# Patient Record
Sex: FEMALE | Race: WHITE | Employment: FULL TIME | URBAN - METROPOLITAN AREA
[De-identification: names, ages, dates, MRNs, and addresses within clinical notes are randomized per-mention and may not be internally consistent; named-entity substitution may affect disease eponyms.]

---

## 2018-05-03 ENCOUNTER — TELEPHONE (OUTPATIENT)
Dept: BREAST CENTER | Age: 48
End: 2018-05-03

## 2018-05-17 ENCOUNTER — OFFICE VISIT (OUTPATIENT)
Dept: BREAST CENTER | Age: 48
End: 2018-05-17
Payer: COMMERCIAL

## 2018-05-17 VITALS
RESPIRATION RATE: 16 BRPM | HEART RATE: 77 BPM | SYSTOLIC BLOOD PRESSURE: 122 MMHG | TEMPERATURE: 98.2 F | HEIGHT: 65 IN | WEIGHT: 140 LBS | OXYGEN SATURATION: 99 % | BODY MASS INDEX: 23.32 KG/M2 | DIASTOLIC BLOOD PRESSURE: 66 MMHG

## 2018-05-17 DIAGNOSIS — N60.99 ATYPICAL LOBULAR HYPERPLASIA (ALH) OF BREAST: ICD-10-CM

## 2018-05-17 PROCEDURE — 99203 OFFICE O/P NEW LOW 30 MIN: CPT

## 2018-05-17 PROCEDURE — 99203 OFFICE O/P NEW LOW 30 MIN: CPT | Performed by: SURGERY

## 2018-06-11 ENCOUNTER — HOSPITAL ENCOUNTER (OUTPATIENT)
Age: 48
Discharge: HOME | End: 2018-06-13
Payer: COMMERCIAL

## 2018-09-17 ENCOUNTER — TELEPHONE (OUTPATIENT)
Dept: BREAST CENTER | Age: 48
End: 2018-09-17

## 2018-09-17 NOTE — TELEPHONE ENCOUNTER
MA attempted to reach patient to set up follow up appt. MA reached patient VM and left detailed message of why was calling and also office number so she can call back to get appt scheduled.     Electronically signed by Precious Maya MA on 9/17/18 at 9:33 AM

## 2018-11-08 ASSESSMENT — ENCOUNTER SYMPTOMS
SHORTNESS OF BREATH: 0
DIARRHEA: 0
RHINORRHEA: 0
ABDOMINAL DISTENTION: 0
NAUSEA: 0
SINUS PRESSURE: 0
BACK PAIN: 0
CHEST TIGHTNESS: 0
SINUS PAIN: 0
ABDOMINAL PAIN: 0
CHOKING: 0
VOMITING: 0
BLOOD IN STOOL: 0
COUGH: 0
SORE THROAT: 0
WHEEZING: 0
EYE ITCHING: 0
CONSTIPATION: 0
TROUBLE SWALLOWING: 0
EYE DISCHARGE: 0
VOICE CHANGE: 0

## 2018-11-08 NOTE — PROGRESS NOTES
Subjective:  Atypical lobular hyperplasia of the left breast on stereotactic breast biopsy in the 2:00 position on 18. Declined endocrine therapy. Patient ID: Ruth Tam is a 50 y.o. female. HPI  History and Physical    Patient's Name/Date of Birth: Ruth Tam / 1970    Ruth Tam presents for evaluation of a Left breast mass. PCP: Xiao Dang MD. Gynecologist:  Dr. Juan Guillermo. The mass was located in the upper inner quadrant position of the Left breast. The patient has not noted an increased size of the mass since presentation. Patient does routinely do self breast exams. Patient denies nipple discharge. Patient denies  previous breast biopsy. Patient denies a personal history of breast cancer. Breast cancer risk factors include family hx of Prostate CA and gender. Age of menarche was 13. Age of menopause is current. Patient denies hormonal therapy, has used OCP x 15y. Patient is . Age of first live birth was 27. Patient did breast feed. Estimated body mass index is 23.3 kg/m² as calculated from the following:    Height as of 18: 5' 5\" (1.651 m). Weight as of 18: 140 lb (63.5 kg). Bra Size: 34B  Caffeine use:  4-5 cups of coffee/day and a few glasses of ice tea  No h/o DVT/PE  No h/o XRT    Father had prostate cancer,     Patient drinks significant caffeinated beverages. She does not smoke cigarettes. 4/10/18, Screening mammogram, [address]:        18, Left spot magnifications, [address]:        Patient underwent a left stereotactic breast biopsy in the 2:00 position on 18. [facility] completed at [facility]) (Clip placed):              Past Surgical History:   Procedure Laterality Date    CARPAL TUNNEL RELEASE Bilateral        No current outpatient prescriptions on file. No current facility-administered medications for this visit.         No Known Allergies    Family History   Problem decreased concentration. The patient is not nervous/anxious. Patient denies previous history of DVT/PE. Objective:   Physical Exam   Constitutional: She is oriented to person, place, and time. She appears well-developed and well-nourished. ECOG 0   HENT:   Head: Normocephalic and atraumatic. Eyes: Pupils are equal, round, and reactive to light. EOM are normal.   Neck: Normal range of motion. No tracheal deviation present. No thyromegaly present. Cardiovascular: Normal rate and regular rhythm. Pulmonary/Chest: Effort normal and breath sounds normal. Right breast exhibits no inverted nipple, no mass, no nipple discharge, no skin change and no tenderness. Left breast exhibits no inverted nipple, no mass, no nipple discharge, no skin change (biopsy site present) and no tenderness. Breasts are dense bilaterally, left greater than right. No skin dimpling or puckering. No nipple discharge. No axillary lymphadenopathy. Abdominal: Soft. She exhibits no distension. There is no tenderness. Musculoskeletal: Normal range of motion. She exhibits no edema. Neurological: She is alert and oriented to person, place, and time. Skin: Skin is warm and dry. Psychiatric: She has a normal mood and affect. Her behavior is normal. Judgment and thought content normal.   Nursing note and vitals reviewed. Assessment:      50 y.o. woman who underwent a left stereotactic breast biopsy in the 2:00 position on 4/24/18. Pathology completed at [facility]) (Clip placed):            4/10/18, Screening mammogram, [address]:        4/19/18, Left spot magnifications, [address]:        MRI Bilateral breast 05/11/2018:  Negative.    -Left screening mammogram @ [address] [facility] on 10/29/2018:  Negative. Report requested.    -Clinically, there is dense breast tissue bilaterally, left greater than right. No convincing evidence of malignancy.   The biopsy site of the left breast, 2:00 position communicated back to the referring physician by way of shared medical record and/or written letter via [address] [facility] mail. I personally and independently saw and examined patient and reviewed all pertinent laboratory data and imaging studies. I have reviewed and agree with the CNP history and review of systems as documented in the above note. Vangie Davis MD, MSc, FACS  11/12/2018  3:32 PM    During today's visit, face-to-face time 33 minutes, greater than 50% in counseling education and coordination of care. All questions were answered to her apparent satisfaction, and she is agreeable to the plan as outlined above.

## 2018-11-09 ENCOUNTER — OFFICE VISIT (OUTPATIENT)
Dept: BREAST CENTER | Age: 48
End: 2018-11-09
Payer: COMMERCIAL

## 2018-11-09 ENCOUNTER — HOSPITAL ENCOUNTER (OUTPATIENT)
Dept: GENERAL RADIOLOGY | Age: 48
Discharge: HOME | End: 2018-11-11
Payer: COMMERCIAL

## 2018-11-09 ENCOUNTER — TELEPHONE (OUTPATIENT)
Dept: BREAST CENTER | Age: 48
End: 2018-11-09

## 2018-11-09 VITALS
RESPIRATION RATE: 12 BRPM | OXYGEN SATURATION: 99 % | DIASTOLIC BLOOD PRESSURE: 70 MMHG | HEIGHT: 65 IN | HEART RATE: 77 BPM | WEIGHT: 136 LBS | BODY MASS INDEX: 22.66 KG/M2 | TEMPERATURE: 97.9 F | SYSTOLIC BLOOD PRESSURE: 118 MMHG

## 2018-11-09 DIAGNOSIS — R92.2 DENSE BREAST TISSUE ON MAMMOGRAM: ICD-10-CM

## 2018-11-09 DIAGNOSIS — N60.99 ATYPICAL LOBULAR HYPERPLASIA (ALH) OF BREAST: Primary | ICD-10-CM

## 2018-11-09 DIAGNOSIS — Z12.39 BREAST CANCER SCREENING, HIGH RISK PATIENT: ICD-10-CM

## 2018-11-09 DIAGNOSIS — N60.99 ATYPICAL LOBULAR HYPERPLASIA (ALH) OF BREAST: ICD-10-CM

## 2018-11-09 PROCEDURE — 99214 OFFICE O/P EST MOD 30 MIN: CPT | Performed by: SURGERY

## 2018-11-09 PROCEDURE — 76642 ULTRASOUND BREAST LIMITED: CPT

## 2018-11-09 PROCEDURE — 99213 OFFICE O/P EST LOW 20 MIN: CPT | Performed by: SURGERY

## 2019-05-06 ASSESSMENT — ENCOUNTER SYMPTOMS
SINUS PAIN: 0
VOICE CHANGE: 0
CONSTIPATION: 0
EYE ITCHING: 0
RHINORRHEA: 0
CHOKING: 0
COUGH: 0
TROUBLE SWALLOWING: 0
DIARRHEA: 0
EYE DISCHARGE: 0
NAUSEA: 0
BLOOD IN STOOL: 0
WHEEZING: 0
SINUS PRESSURE: 0
CHEST TIGHTNESS: 0
BACK PAIN: 0
VOMITING: 0
SHORTNESS OF BREATH: 0
SORE THROAT: 0
ABDOMINAL DISTENTION: 0
ABDOMINAL PAIN: 0

## 2019-05-06 NOTE — PROGRESS NOTES
Subjective:  Atypical lobular hyperplasia of the left breast on stereotactic breast biopsy in the 2:00 position on 18. Declined endocrine therapy. Patient ID: Miri Woods is a 46 y.o. female. HPI  History and Physical    Patient's Name/Date of Birth: Miri Woods / 1970    Miri Woods presents for evaluation of a Left breast mass. PCP: Natasha Carter MD. Gynecologist:  Dr. Shyann Bar. The mass was located in the upper inner quadrant position of the Left breast. The patient has not noted an increased size of the mass since presentation. Patient does routinely do self breast exams. Patient denies nipple discharge. Patient denies  previous breast biopsy. Patient denies a personal history of breast cancer. Breast cancer risk factors include family hx of Prostate CA and gender. Age of menarche was 13. Age of menopause is current. Patient denies hormonal therapy, has used OCP x 15y. Patient is . Age of first live birth was 27. Patient did breast feed. Estimated body mass index is 22.63 kg/m² as calculated from the following:    Height as of 18: 5' 5\" (1.651 m). Weight as of 18: 136 lb (61.7 kg). Bra Size: 34B  Caffeine use:  4-5 cups of coffee/day and a few glasses of ice tea  No h/o DVT/PE  No h/o XRT    Father had prostate cancer,     Patient drinks significant caffeinated beverages. She does not smoke cigarettes. 4/10/18, Screening mammogram, [address]:        18, Left spot magnifications, [address]:        Patient underwent a left stereotactic breast biopsy in the 2:00 position on 18. Pathology completed at [facility]) (Clip placed):          Past Surgical History:   Procedure Laterality Date    CARPAL TUNNEL RELEASE Bilateral        No current outpatient medications on file. No current facility-administered medications for this visit.         No Known Allergies    Family History   Problem Relation disturbance. Respiratory: Negative for cough, choking, chest tightness, shortness of breath and wheezing. Cardiovascular: Negative for chest pain, palpitations and leg swelling. Gastrointestinal: Negative for abdominal distention, abdominal pain, blood in stool, constipation, diarrhea, nausea and vomiting. Endocrine: Negative for cold intolerance and heat intolerance. Genitourinary: Negative for difficulty urinating, dysuria, frequency and hematuria. Musculoskeletal: Negative for arthralgias, back pain, gait problem, joint swelling, myalgias, neck pain and neck stiffness. Allergic/Immunologic: Negative for environmental allergies and food allergies. Neurological: Negative for dizziness, seizures, syncope, speech difficulty, weakness, light-headedness and headaches. Hematological: Negative for adenopathy. Does not bruise/bleed easily. Psychiatric/Behavioral: Negative for agitation, confusion and decreased concentration. The patient is not nervous/anxious. Patient denies previous history of DVT/PE. Objective:   Physical Exam   Constitutional: She is oriented to person, place, and time. She appears well-developed and well-nourished. ECOG 0   HENT:   Head: Normocephalic and atraumatic. Eyes: Pupils are equal, round, and reactive to light. EOM are normal.   Neck: Normal range of motion. No tracheal deviation present. No thyromegaly present. Cardiovascular: Normal rate and regular rhythm. Pulmonary/Chest: Effort normal and breath sounds normal. Right breast exhibits no inverted nipple, no mass, no nipple discharge, no skin change and no tenderness. Left breast exhibits no inverted nipple, no mass, no nipple discharge, no skin change (biopsy site present) and no tenderness. Breasts are dense bilaterally, left greater than right. No skin dimpling or puckering. No nipple discharge. No clinically suspicious findings on today's examination. No axillary lymphadenopathy. Abdominal: Soft. She exhibits no distension. There is no tenderness. Musculoskeletal: Normal range of motion. She exhibits no edema. Neurological: She is alert and oriented to person, place, and time. Skin: Skin is warm and dry. Psychiatric: She has a normal mood and affect. Her behavior is normal. Judgment and thought content normal.   Nursing note and vitals reviewed. Assessment:      46 y.o. woman who underwent a left stereotactic breast biopsy in the 2:00 position on 4/24/18. Pathology completed at [facility]) (Clip placed):            4/10/18, Screening mammogram, [address]:        4/19/18, Left spot magnifications, [address]:        MRI Bilateral breast 05/11/2018:  Negative.    -Left screening mammogram @ [address] [facility] on 10/29/2018:  Negative. Report requested.    -Clinically, there is dense breast tissue bilaterally, left greater than right. No convincing evidence of malignancy. The biopsy site of the left breast, 2:00 position with residual post-biopsy mass. Check left breast US today.    -Chemoprevention discussed on prior visits; she declined and prefers to continue high risk screening per protocol.    -High risk screening:  Dar Cuzick score 26%. Discussed MRI of breast and advised her that breast MRI has high sensitivity but the specificity is lower due to the overlap in the enhancement pattern of benign and malignant lesions. This may lead to false positive results and invasive testing and procedures that may be unnecessary (i.e., biopsy, excision, etc.). We reviewed potential risks of Gadolinium and that risks, if any, are not fully understood. The FDA states there is no clinical evidence that directly links gadolinium retention to adverse effects in patients with normal kidney function. We reviewed that it's important to have blood work to ensure adequate kidney function prior to MRI. Reviewed FDA statement with Lesli Richmond:   There are some studies that report Gadolinium contrast agents are partly retained in the brain, rasing safety concerns. Risk of Nephrogenic Systemic Fibrosis (NSF), a disease of fibrosis of the skin and internal organs reminiscent but distinct from scleroderma or scleromyxedema. It is caused by gadolinium exposure used in imaging in patients who have renal insufficiency. The FDA stated there is no clinical evidence that directly links gadolinium retention to adverse health effects in patients with normal kidney function, and the FDA has concluded that the benefit of all approved GBCAs continues to outweigh any potential risks. Feature  Magnetic Resonance Imaging (MRI)  February 16, 2018  Jonathan Starch   The Debate Over Gadolinium MRI Contrast Toxicity:  A discussion on the safety of gadolinium-based contrast agents  [facility].        Plan:   1. Continue monthly breast self examination; detailed instructions reviewed today. Bring any changes to your physician's attention. 2. Continue healthy diet and exercise routinely as tolerated. 3. Avoid alcohol. 4. Limit caffeine intake. 5. Repeat mammogram May 2020.  6. Continue follow up with Primary Care. 7. RTC 6 months with MRI bilateral breast Prior (ordered). During today's visit, face-to-face time 25 minutes, greater than 50% in counseling education and coordination of care. All questions were answered to her apparent satisfaction, and she is agreeable to the plan as outlined above. Jadyn Cuevas, RN, MSN, APRN-CNP, [address]  Advanced Oncology Certified Nurse Practitioner  Department of Breast Surgery  [facility] [place]/  [facility] in collaboration with Dr. Ezekiel Snow/Sarah Rouse      1. Continue monthly breast self examination. Bring any changes to your physician's attention. 2. Avoid excessive caffeine intake.    3. I calculated her Isadore Lambert risk model which is 3% for 5y and 26% for lifetime to age 80. She is already doing risk modification of not smoking and staying trim. I advised exercise and cutting back on caffeine. She may also choose to go on tamoxifen to decrease her risk. I discussed risks and benefits including increased risk of endometrial cancer and DVTs but she will have a decreased risk of breast cancer. She declined at this time. Written information provided. 4. Next MRI Bilateral breast November 2019 per high risk protocol. During today's visit, face-to-face time 25 minutes, greater than 50% in counseling education and coordination of care. All questions were answered to her apparent satisfaction, and she is agreeable to the plan as outlined above. Lolis Wang, RN, MSN, APRN-CNP, [address]  Advanced Oncology Certified Nurse Practitioner  Department of Breast Surgery  [facility]/  [facility] in collaboration with Dr. Cyndi Snow/Sarah Rubalcava

## 2019-05-09 ENCOUNTER — OFFICE VISIT (OUTPATIENT)
Dept: BREAST CENTER | Age: 49
End: 2019-05-09
Payer: COMMERCIAL

## 2019-05-09 ENCOUNTER — HOSPITAL ENCOUNTER (OUTPATIENT)
Dept: GENERAL RADIOLOGY | Age: 49
Discharge: HOME | End: 2019-05-11
Payer: COMMERCIAL

## 2019-05-09 VITALS
HEIGHT: 65 IN | DIASTOLIC BLOOD PRESSURE: 66 MMHG | OXYGEN SATURATION: 98 % | WEIGHT: 141.4 LBS | HEART RATE: 71 BPM | TEMPERATURE: 98.5 F | RESPIRATION RATE: 16 BRPM | BODY MASS INDEX: 23.56 KG/M2 | SYSTOLIC BLOOD PRESSURE: 100 MMHG

## 2019-05-09 DIAGNOSIS — R92.2 DENSE BREAST TISSUE ON MAMMOGRAM: ICD-10-CM

## 2019-05-09 DIAGNOSIS — Z91.89 AT HIGH RISK FOR BREAST CANCER: Primary | ICD-10-CM

## 2019-05-09 DIAGNOSIS — N60.99 ATYPICAL LOBULAR HYPERPLASIA (ALH) OF BREAST: ICD-10-CM

## 2019-05-09 DIAGNOSIS — Z12.39 BREAST CANCER SCREENING, HIGH RISK PATIENT: ICD-10-CM

## 2019-05-09 DIAGNOSIS — R92.2 DENSE BREAST: ICD-10-CM

## 2019-05-09 PROCEDURE — 99214 OFFICE O/P EST MOD 30 MIN: CPT | Performed by: NURSE PRACTITIONER

## 2019-05-09 PROCEDURE — 77063 BREAST TOMOSYNTHESIS BI: CPT

## 2019-05-09 PROCEDURE — 99213 OFFICE O/P EST LOW 20 MIN: CPT | Performed by: NURSE PRACTITIONER

## 2019-10-30 ENCOUNTER — TELEPHONE (OUTPATIENT)
Dept: BREAST CENTER | Age: 49
End: 2019-10-30

## 2019-10-31 ENCOUNTER — TELEPHONE (OUTPATIENT)
Dept: BREAST CENTER | Age: 49
End: 2019-10-31

## 2019-11-06 ENCOUNTER — TELEPHONE (OUTPATIENT)
Dept: BREAST CENTER | Age: 49
End: 2019-11-06

## 2019-11-14 ENCOUNTER — TELEPHONE (OUTPATIENT)
Dept: BREAST CENTER | Age: 49
End: 2019-11-14

## 2019-11-14 DIAGNOSIS — N60.99 ATYPICAL LOBULAR HYPERPLASIA (ALH) OF BREAST: Primary | ICD-10-CM

## 2019-11-14 DIAGNOSIS — Z12.39 BREAST CANCER SCREENING, HIGH RISK PATIENT: ICD-10-CM

## 2019-11-14 DIAGNOSIS — R92.2 DENSE BREAST: ICD-10-CM

## 2019-11-14 DIAGNOSIS — Z91.89 AT HIGH RISK FOR BREAST CANCER: ICD-10-CM

## 2019-11-14 DIAGNOSIS — R92.2 DENSE BREAST TISSUE ON MAMMOGRAM: ICD-10-CM

## 2019-11-26 ENCOUNTER — HOSPITAL ENCOUNTER (OUTPATIENT)
Dept: MRI IMAGING | Age: 49
Discharge: HOME | End: 2019-11-28
Payer: COMMERCIAL

## 2019-11-26 DIAGNOSIS — Z91.89 AT HIGH RISK FOR BREAST CANCER: ICD-10-CM

## 2019-11-26 DIAGNOSIS — N60.99 ATYPICAL LOBULAR HYPERPLASIA (ALH) OF BREAST: ICD-10-CM

## 2019-11-26 DIAGNOSIS — Z12.39 BREAST CANCER SCREENING, HIGH RISK PATIENT: ICD-10-CM

## 2019-11-26 DIAGNOSIS — R92.2 DENSE BREAST TISSUE ON MAMMOGRAM: ICD-10-CM

## 2019-11-26 DIAGNOSIS — R92.2 DENSE BREAST: ICD-10-CM

## 2019-11-26 DIAGNOSIS — N60.99 ATYPICAL LOBULAR HYPERPLASIA (ALH) OF BREAST: Primary | ICD-10-CM

## 2019-11-26 PROCEDURE — A9577 INJ MULTIHANCE: HCPCS | Performed by: RADIOLOGY

## 2019-11-26 PROCEDURE — 6360000004 HC RX CONTRAST MEDICATION: Performed by: RADIOLOGY

## 2019-11-26 PROCEDURE — 77049 MRI BREAST C-+ W/CAD BI: CPT

## 2019-11-26 RX ADMIN — GADOBENATE DIMEGLUMINE 14 ML: 529 INJECTION, SOLUTION INTRAVENOUS at 09:26

## 2019-12-03 ENCOUNTER — TELEPHONE (OUTPATIENT)
Dept: BREAST CENTER | Age: 49
End: 2019-12-03

## 2019-12-05 DIAGNOSIS — Z91.89 AT HIGH RISK FOR BREAST CANCER: Primary | ICD-10-CM

## 2019-12-05 DIAGNOSIS — N63.10 MASSES OF BOTH BREASTS: ICD-10-CM

## 2019-12-05 DIAGNOSIS — N63.20 MASSES OF BOTH BREASTS: ICD-10-CM

## 2019-12-06 ASSESSMENT — ENCOUNTER SYMPTOMS
SINUS PRESSURE: 0
ABDOMINAL PAIN: 0
CHEST TIGHTNESS: 0
TROUBLE SWALLOWING: 0
SINUS PAIN: 0
COUGH: 0
ABDOMINAL DISTENTION: 0
CONSTIPATION: 0
SORE THROAT: 0
CHOKING: 0
RHINORRHEA: 0
EYE ITCHING: 0
BACK PAIN: 0
VOMITING: 0
DIARRHEA: 0
BLOOD IN STOOL: 0
NAUSEA: 0
VOICE CHANGE: 0
WHEEZING: 0
SHORTNESS OF BREATH: 0
EYE DISCHARGE: 0

## 2019-12-12 ENCOUNTER — HOSPITAL ENCOUNTER (OUTPATIENT)
Dept: GENERAL RADIOLOGY | Age: 49
Discharge: HOME | End: 2019-12-14
Payer: COMMERCIAL

## 2019-12-12 ENCOUNTER — OFFICE VISIT (OUTPATIENT)
Dept: BREAST CENTER | Age: 49
End: 2019-12-12
Payer: COMMERCIAL

## 2019-12-12 VITALS
BODY MASS INDEX: 22.53 KG/M2 | DIASTOLIC BLOOD PRESSURE: 67 MMHG | OXYGEN SATURATION: 99 % | HEART RATE: 79 BPM | RESPIRATION RATE: 16 BRPM | SYSTOLIC BLOOD PRESSURE: 105 MMHG | WEIGHT: 135.2 LBS | TEMPERATURE: 97.7 F | HEIGHT: 65 IN

## 2019-12-12 DIAGNOSIS — R92.2 DENSE BREAST: ICD-10-CM

## 2019-12-12 DIAGNOSIS — N63.10 MASSES OF BOTH BREASTS: ICD-10-CM

## 2019-12-12 DIAGNOSIS — Z91.89 AT HIGH RISK FOR BREAST CANCER: ICD-10-CM

## 2019-12-12 DIAGNOSIS — N63.20 MASSES OF BOTH BREASTS: ICD-10-CM

## 2019-12-12 DIAGNOSIS — Z91.89 AT HIGH RISK FOR BREAST CANCER: Primary | ICD-10-CM

## 2019-12-12 DIAGNOSIS — Z12.39 BREAST CANCER SCREENING, HIGH RISK PATIENT: ICD-10-CM

## 2019-12-12 DIAGNOSIS — N60.99 ATYPICAL LOBULAR HYPERPLASIA (ALH) OF BREAST: ICD-10-CM

## 2019-12-12 DIAGNOSIS — R92.2 DENSE BREAST TISSUE ON MAMMOGRAM: ICD-10-CM

## 2019-12-12 PROCEDURE — 76642 ULTRASOUND BREAST LIMITED: CPT

## 2019-12-12 PROCEDURE — 99214 OFFICE O/P EST MOD 30 MIN: CPT | Performed by: NURSE PRACTITIONER

## 2019-12-12 PROCEDURE — 99213 OFFICE O/P EST LOW 20 MIN: CPT

## 2020-05-22 ASSESSMENT — ENCOUNTER SYMPTOMS
SORE THROAT: 0
ABDOMINAL PAIN: 0
VOMITING: 0
RHINORRHEA: 0
SINUS PRESSURE: 0
WHEEZING: 0
CHEST TIGHTNESS: 0
VOICE CHANGE: 0
CONSTIPATION: 0
TROUBLE SWALLOWING: 0
SHORTNESS OF BREATH: 0
BACK PAIN: 0
DIARRHEA: 0
ABDOMINAL DISTENTION: 0
COUGH: 0
NAUSEA: 0
EYE DISCHARGE: 0
BLOOD IN STOOL: 0
EYE ITCHING: 0
CHOKING: 0
SINUS PAIN: 0

## 2020-05-22 NOTE — PROGRESS NOTES
Subjective:  Atypical lobular hyperplasia of the left breast on stereotactic breast biopsy in the 2:00 position on 18. Declined endocrine therapy. Some elements of all sections below were copied from  previous note 19 and have been re-examined and updated where appropriate. All elements reflect the medical decision making of today May 28, 2020    Patient ID: Kirsten Monroy is a 44 y.o. female. HPIHistory and Physical    Patient's Name/Date of Birth: Kirsten Monroy / 1970    Kirsten Monroy presents for clinical evaluation follow up. She has history of a left atypical lobular hyperplasia. PCP: Viky Fierro MD. Gynecologist:  Dr. Yeimy Jensen. The mass was located in the upper inner quadrant position of the Left breast. The patient has not noted an increased size of the mass since presentation. Patient does routinely do self breast exams. Patient denies nipple discharge. Patient denies  previous breast biopsy. Patient denies a personal history of breast cancer. Breast cancer risk factors include family hx of Prostate CA and gender. Age of menarche was 13. Age of menopause is current. Patient denies hormonal therapy, has used OCP x 15y. Patient is . Age of first live birth was 27. Patient did breast feed. Recent menstrual period after one year, underwent endometrial biopsy: negative. Estimated body mass index is 22.96 kg/m² as calculated from the following:    Height as of this encounter: 5' 5\" (1.651 m). Weight as of this encounter: 138 lb (62.6 kg). Bra Size: 34B  Caffeine use:  4-5 cups of coffee/day and a few glasses of ice tea  No h/o DVT/PE  No h/o XRT    Father had prostate cancer,     Patient drinks significant caffeinated beverages. She does not smoke cigarettes.       4/10/18, Screening mammogram, [address]:        18, Left spot magnifications, [address]:        Patient underwent a left stereotactic breast biopsy in the 2:00 position on 4/24/18. Pathology completed at [facility] (Clip placed):      5/28/2020: Bilateral Mammogram; [facility]      MAMMOGRAM FINDINGS:   Finding 1:   No suspicious masses, areas of suspicious architectural distortion, suspicious calcifications, or additional suspicious findings are identified.  There are no significant changes from the prior study.       IMPRESSION:   No mammographic evidence of malignancy.       Screening mammogram in 1 year is recommended as well as annual breast MRI.         All provided imaging reviewed with patient. Past Surgical History:   Procedure Laterality Date    CARPAL TUNNEL RELEASE Bilateral        No current outpatient medications on file. No current facility-administered medications for this visit.         No Known Allergies    Family History   Problem Relation Age of Onset    Cancer Father         skin and prostate    Cancer Brother         nonhodgkins lymphoma    Cancer Maternal Aunt         Breast    Cancer Maternal Aunt         Breast       Social History     Socioeconomic History    Marital status:      Spouse name: Not on file    Number of children: Not on file    Years of education: Not on file    Highest education level: Not on file   Occupational History    Not on file   Social Needs    Financial resource strain: Not on file    Food insecurity     Worry: Not on file     Inability: Not on file    Transportation needs     Medical: Not on file     Non-medical: Not on file   Tobacco Use    Smoking status: Never Smoker    Smokeless tobacco: Never Used   Substance and Sexual Activity    Alcohol use: No     Comment: social    Drug use: No    Sexual activity: Not on file   Lifestyle    Physical activity     Days per week: Not on file     Minutes per session: Not on file    Stress: Not on file   Relationships    Social connections     Talks on phone: Not on file     Gets together: Not on file     Attends Synagogue service: Not on file     Active member of club or organization: Not on file     Attends meetings of clubs or organizations: Not on file     Relationship status: Not on file    Intimate partner violence     Fear of current or ex partner: Not on file     Emotionally abused: Not on file     Physically abused: Not on file     Forced sexual activity: Not on file   Other Topics Concern    Not on file   Social History Narrative    Not on file       Review of Systems   Constitutional: Negative for activity change, appetite change, chills, fatigue, fever and unexpected weight change. Feels well. Working full time at [employer]. HENT: Negative for congestion, postnasal drip, rhinorrhea, sinus pressure, sinus pain, sore throat, trouble swallowing and voice change. Eyes: Negative for discharge, itching and visual disturbance. Respiratory: Negative for cough, choking, chest tightness, shortness of breath and wheezing. Cardiovascular: Negative for chest pain, palpitations and leg swelling. Gastrointestinal: Negative for abdominal distention, abdominal pain, blood in stool, constipation, diarrhea, nausea and vomiting. Endocrine: Negative for cold intolerance and heat intolerance. Genitourinary: Negative for difficulty urinating, dysuria, frequency and hematuria. Musculoskeletal: Negative for arthralgias, back pain, gait problem, joint swelling, myalgias, neck pain and neck stiffness. Allergic/Immunologic: Negative for environmental allergies and food allergies. Neurological: Negative for dizziness, seizures, syncope, speech difficulty, weakness, light-headedness and headaches. Hematological: Negative for adenopathy. Does not bruise/bleed easily. Psychiatric/Behavioral: Negative for agitation, confusion and decreased concentration. The patient is not nervous/anxious. Patient denies previous history of DVT/PE. Review of systems as noted above completely reviewed with patient.   No paternal Aunts had breast cancer diagnosed in their 66's; their daughters are alive and well with no evidence of cancer. No further family history of cancer.     -Jorge Luis Burkett has two older sisters who have no breast related concerns at this time. -Differed chemoprevention in the past.     Plan:   1. Continue monthly breast self examination; detailed instructions reviewed today. Bring any changes to your physician's attention. 2. Continue healthy diet and exercise routinely as tolerated. 3. Avoid alcohol. 4. Limit caffeine intake. 5. Repeat mammogram May 2021, MRI 11/20 with OV. 6. Continue follow up with Primary Care. 7. RTC 11/20        During today's visit, face-to-face time 25  minutes, greater than 50% in counseling education and coordination of care. All questions were answered to her apparent satisfaction, and she is agreeable to the plan as outlined above. Barbie Figueroa MD FACS  May 28, 2020

## 2020-05-28 ENCOUNTER — OFFICE VISIT (OUTPATIENT)
Dept: BREAST CENTER | Age: 50
End: 2020-05-28
Payer: COMMERCIAL

## 2020-05-28 ENCOUNTER — HOSPITAL ENCOUNTER (OUTPATIENT)
Dept: GENERAL RADIOLOGY | Age: 50
Discharge: HOME | End: 2020-05-30
Payer: COMMERCIAL

## 2020-05-28 VITALS
HEIGHT: 65 IN | RESPIRATION RATE: 18 BRPM | HEART RATE: 82 BPM | DIASTOLIC BLOOD PRESSURE: 60 MMHG | SYSTOLIC BLOOD PRESSURE: 134 MMHG | OXYGEN SATURATION: 98 % | TEMPERATURE: 97.8 F | WEIGHT: 138 LBS | BODY MASS INDEX: 22.99 KG/M2

## 2020-05-28 PROCEDURE — 99213 OFFICE O/P EST LOW 20 MIN: CPT | Performed by: SURGERY

## 2020-05-28 PROCEDURE — G0279 TOMOSYNTHESIS, MAMMO: HCPCS

## 2020-05-28 PROCEDURE — 99214 OFFICE O/P EST MOD 30 MIN: CPT | Performed by: SURGERY

## 2020-10-15 ENCOUNTER — TELEPHONE (OUTPATIENT)
Dept: BREAST CENTER | Age: 50
End: 2020-10-15

## 2020-10-15 NOTE — TELEPHONE ENCOUNTER
Left message with call back number in reference to scheduling her breast MRI in November. No cycles, patient will need lab work.

## 2020-11-17 ENCOUNTER — TELEPHONE (OUTPATIENT)
Dept: BREAST CENTER | Age: 50
End: 2020-11-17

## 2020-11-17 NOTE — TELEPHONE ENCOUNTER
Authorization has been obtained for breast MRI [id number] -- Approval # [id number] valid until 12/16/20  - per [facility]

## 2020-11-18 ENCOUNTER — TELEPHONE (OUTPATIENT)
Dept: BREAST CENTER | Age: 50
End: 2020-11-18

## 2020-11-18 NOTE — TELEPHONE ENCOUNTER
Left message with call back number in reference to scheduling her breast MRI and completing the MRI questionnaire prior.

## 2020-11-30 DIAGNOSIS — N60.99 ATYPICAL LOBULAR HYPERPLASIA (ALH) OF BREAST: ICD-10-CM

## 2020-11-30 LAB
BUN BLDV-MCNC: 15 MG/DL (ref 6–20)
CREAT SERPL-MCNC: 0.8 MG/DL (ref 0.5–1)
GFR AFRICAN AMERICAN: >60
GFR NON-AFRICAN AMERICAN: >60 ML/MIN/1.73

## 2020-12-03 ENCOUNTER — HOSPITAL ENCOUNTER (OUTPATIENT)
Dept: MRI IMAGING | Age: 50
Discharge: HOME | End: 2020-12-05
Payer: COMMERCIAL

## 2020-12-03 PROCEDURE — A9585 GADOBUTROL INJECTION: HCPCS | Performed by: RADIOLOGY

## 2020-12-03 PROCEDURE — 77049 MRI BREAST C-+ W/CAD BI: CPT

## 2020-12-03 PROCEDURE — 6360000004 HC RX CONTRAST MEDICATION: Performed by: RADIOLOGY

## 2020-12-03 RX ADMIN — GADOBUTROL 6 ML: 604.72 INJECTION INTRAVENOUS at 08:37

## 2020-12-04 ENCOUNTER — TELEPHONE (OUTPATIENT)
Dept: BREAST CENTER | Age: 50
End: 2020-12-04

## 2021-07-01 DIAGNOSIS — Z12.31 ENCOUNTER FOR SCREENING MAMMOGRAM FOR HIGH-RISK PATIENT: Primary | ICD-10-CM

## 2021-07-01 DIAGNOSIS — N60.99 ATYPICAL LOBULAR HYPERPLASIA (ALH) OF BREAST: ICD-10-CM

## 2021-07-14 ASSESSMENT — ENCOUNTER SYMPTOMS
VOICE CHANGE: 0
SHORTNESS OF BREATH: 0
SORE THROAT: 0
BLOOD IN STOOL: 0
EYE ITCHING: 0
NAUSEA: 0
EYE DISCHARGE: 0
BACK PAIN: 0
CONSTIPATION: 0
SINUS PRESSURE: 0
TROUBLE SWALLOWING: 0
WHEEZING: 0
ABDOMINAL DISTENTION: 0
ABDOMINAL PAIN: 0
COUGH: 0
CHEST TIGHTNESS: 0
SINUS PAIN: 0
VOMITING: 0
DIARRHEA: 0
CHOKING: 0
RHINORRHEA: 0

## 2021-07-14 NOTE — PROGRESS NOTES
Pathology completed at [facility]) (Clip placed):        Past Surgical History:   Procedure Laterality Date    CARPAL TUNNEL RELEASE Bilateral        No current outpatient medications on file. No current facility-administered medications for this visit. No Known Allergies    Family History   Problem Relation Age of Onset    Cancer Father         skin and prostate    Cancer Brother         nonhodgkins lymphoma    Cancer Maternal Aunt         Breast    Cancer Maternal Aunt         Breast       Social History     Socioeconomic History    Marital status:      Spouse name: Not on file    Number of children: Not on file    Years of education: Not on file    Highest education level: Not on file   Occupational History    Not on file   Tobacco Use    Smoking status: Never Smoker    Smokeless tobacco: Never Used   Vaping Use    Vaping Use: Never used   Substance and Sexual Activity    Alcohol use: No     Comment: social    Drug use: No    Sexual activity: Not on file   Other Topics Concern    Not on file   Social History Narrative    Not on file     Social Determinants of Health     Financial Resource Strain:     Difficulty of Paying Living Expenses:    Food Insecurity:     Worried About Running Out of Food in the Last Year:     [address] in the Last Year:    Transportation Needs:     Lack of Transportation (Medical):      Lack of Transportation (Non-Medical):    Physical Activity:     Days of Exercise per Week:     Minutes of Exercise per Session:    Stress:     Feeling of Stress :    Social Connections:     Frequency of Communication with Friends and Family:     Frequency of Social Gatherings with Friends and Family:     Attends Restorationist Services:     Active Member of Clubs or Organizations:     Attends Club or Organization Meetings:     Marital Status:    Intimate Partner Violence:     Fear of Current or Ex-Partner:     Emotionally Abused:     Physically Abused:  Sexually Abused:        Review of Systems   Constitutional: Negative for activity change, appetite change, chills, fatigue, fever and unexpected weight change. Working full time at [employer] in [place], [place]; Son just graduated. No breast related complaints on this visit. HENT: Negative for congestion, postnasal drip, rhinorrhea, sinus pressure, sinus pain, sore throat, trouble swallowing and voice change. Eyes: Negative for discharge, itching and visual disturbance. Respiratory: Negative for cough, choking, chest tightness, shortness of breath and wheezing. Cardiovascular: Negative for chest pain, palpitations and leg swelling. Gastrointestinal: Negative for abdominal distention, abdominal pain, blood in stool, constipation, diarrhea, nausea and vomiting. Endocrine: Negative for cold intolerance and heat intolerance. Genitourinary: Negative for difficulty urinating, dysuria, frequency and hematuria. Musculoskeletal: Negative for arthralgias, back pain, gait problem, joint swelling, myalgias, neck pain and neck stiffness. Allergic/Immunologic: Negative for environmental allergies and food allergies. Neurological: Negative for dizziness, seizures, syncope, speech difficulty, weakness, light-headedness and headaches. Hematological: Negative for adenopathy. Does not bruise/bleed easily. Psychiatric/Behavioral: Negative for agitation, confusion and decreased concentration. The patient is not nervous/anxious. Patient denies previous history of DVT/PE. Review of systems as noted above completely reviewed with patient. No changes. Objective:   Physical Exam  Vitals and nursing note reviewed. Constitutional:       Appearance: She is well-developed and normal weight. Comments: ECOG again remains stable at 0; pleasant and conversant. HENT:      Head: Normocephalic and atraumatic. Eyes:      Pupils: Pupils are equal, round, and reactive to light.    Neck: -05/28/2020: B/L screening mammogram; [facility]: Negative, BI-RADS 1  -12/03/2020 MRI B/L breast:  Post biopsy changes on left; Benign, BI-RADS 2.  -07/19/2021 B/L screening mammogram: Negative, BI_RADS 1.  -07/19/2021 Clinical follow up: is without evidence of malignancy. History reviewed on this visit; no change in risk for breast cancer as it relates to family history. We reviewed her hx ALH as it relates to breast cancer risk. She declines chemoprevention with Tamoxifen and prefers to continue high risk screening per protocol. Imaging reviewed. -Pertinent Family history:  both maternal Aunts had breast cancer diagnosed in their 66's; their daughters are alive and well with no evidence of cancer. No further family history of cancer. Plan:   1. Continue monthly breast self examination; detailed instructions reviewed today. Bring any changes to your physician's attention. 2. Continue healthy diet and exercise routinely as tolerated. 3. Avoid alcohol. 4. Limit caffeine intake. 5. Repeat MRI 01/2022 with OV. 6. Continue follow up with Primary Care. 7. RTC 6 months with MRI of the bilateral breasts prior      During today's visit, face-to-face time 25  minutes, greater than 50% in counseling education and coordination of care. All questions were answered to her apparent satisfaction, and she is agreeable to the plan as outlined above. Zeke Huber, RN, MSN, APRN-CNP, [address]  Advanced Oncology Certified Nurse Practitioner  Department of Breast Surgery  [facility] [place]/  [facility] in collaboration with Dr. Rajiv Snow/Dr. Kecia Prescott APRN-CNP

## 2021-07-19 ENCOUNTER — OFFICE VISIT (OUTPATIENT)
Dept: BREAST CENTER | Age: 51
End: 2021-07-19
Payer: COMMERCIAL

## 2021-07-19 ENCOUNTER — HOSPITAL ENCOUNTER (OUTPATIENT)
Dept: GENERAL RADIOLOGY | Age: 51
Discharge: HOME | End: 2021-07-21
Payer: COMMERCIAL

## 2021-07-19 VITALS
RESPIRATION RATE: 12 BRPM | HEART RATE: 73 BPM | DIASTOLIC BLOOD PRESSURE: 68 MMHG | SYSTOLIC BLOOD PRESSURE: 102 MMHG | WEIGHT: 144.4 LBS | TEMPERATURE: 98 F | OXYGEN SATURATION: 99 % | BODY MASS INDEX: 24.06 KG/M2 | HEIGHT: 65 IN

## 2021-07-19 VITALS — WEIGHT: 140 LBS | BODY MASS INDEX: 23.32 KG/M2 | HEIGHT: 65 IN

## 2021-07-19 DIAGNOSIS — Z12.31 ENCOUNTER FOR SCREENING MAMMOGRAM FOR HIGH-RISK PATIENT: ICD-10-CM

## 2021-07-19 DIAGNOSIS — N60.99 ATYPICAL LOBULAR HYPERPLASIA (ALH) OF BREAST: ICD-10-CM

## 2021-07-19 DIAGNOSIS — R92.2 DENSE BREAST TISSUE ON MAMMOGRAM: ICD-10-CM

## 2021-07-19 DIAGNOSIS — Z91.89 AT HIGH RISK FOR BREAST CANCER: Primary | ICD-10-CM

## 2021-07-19 DIAGNOSIS — Z12.39 BREAST CANCER SCREENING, HIGH RISK PATIENT: ICD-10-CM

## 2021-07-19 DIAGNOSIS — R92.2 DENSE BREAST: ICD-10-CM

## 2021-07-19 PROCEDURE — 99213 OFFICE O/P EST LOW 20 MIN: CPT | Performed by: NURSE PRACTITIONER

## 2021-07-19 PROCEDURE — 77063 BREAST TOMOSYNTHESIS BI: CPT

## 2021-07-19 NOTE — PATIENT INSTRUCTIONS

## 2022-01-04 DIAGNOSIS — Z91.89 AT HIGH RISK FOR BREAST CANCER: Primary | ICD-10-CM

## 2022-01-14 ENCOUNTER — TELEPHONE (OUTPATIENT)
Dept: BREAST CENTER | Age: 52
End: 2022-01-14

## 2022-01-14 NOTE — TELEPHONE ENCOUNTER
Authorization obtained for breast MRI 35613 - Approval # [id number] valid until 2/12/22 per [facility] / Rob Isaacs

## 2022-01-28 DIAGNOSIS — Z91.89 AT HIGH RISK FOR BREAST CANCER: ICD-10-CM

## 2022-01-28 LAB
BUN BLDV-MCNC: 17 MG/DL (ref 6–20)
CREAT SERPL-MCNC: 0.9 MG/DL (ref 0.5–1)
GFR AFRICAN AMERICAN: >60
GFR NON-AFRICAN AMERICAN: >60 ML/MIN/1.73

## 2022-02-02 ENCOUNTER — HOSPITAL ENCOUNTER (OUTPATIENT)
Dept: MRI IMAGING | Age: 52
Discharge: HOME | End: 2022-02-04
Payer: COMMERCIAL

## 2022-02-02 DIAGNOSIS — R92.2 DENSE BREAST: ICD-10-CM

## 2022-02-02 DIAGNOSIS — Z12.39 BREAST CANCER SCREENING, HIGH RISK PATIENT: ICD-10-CM

## 2022-02-02 DIAGNOSIS — Z91.89 AT HIGH RISK FOR BREAST CANCER: ICD-10-CM

## 2022-02-02 DIAGNOSIS — R92.2 DENSE BREAST TISSUE ON MAMMOGRAM: ICD-10-CM

## 2022-02-02 PROCEDURE — 77049 MRI BREAST C-+ W/CAD BI: CPT

## 2022-02-02 PROCEDURE — C8908 MRI W/O FOL W/CONT, BREAST,: HCPCS

## 2022-02-02 PROCEDURE — 6360000004 HC RX CONTRAST MEDICATION: Performed by: RADIOLOGY

## 2022-02-02 PROCEDURE — A9585 GADOBUTROL INJECTION: HCPCS | Performed by: RADIOLOGY

## 2022-02-02 RX ADMIN — GADOBUTROL 6 ML: 604.72 INJECTION INTRAVENOUS at 13:52

## 2022-02-04 ENCOUNTER — TELEPHONE (OUTPATIENT)
Dept: BREAST CENTER | Age: 52
End: 2022-02-04

## 2022-02-04 NOTE — TELEPHONE ENCOUNTER
Called and left message with call back number to schedule a clinical appointment following her recent breast MRI. Results negative. HISTORY:   ORDERING SYSTEM PROVIDED HISTORY: At high risk for breast cancer   TECHNOLOGIST PROVIDED HISTORY:   Reason for exam:->Hx atypical Lobular hyperplasia.       FINDINGS:   There is heterogeneous fibroglandular tissue with minimal background   parenchymal enhancement.  No suspicious mass or non mass enhancement seen.    There is no lymphadenopathy.  Bilateral skin and nipple-areolar complexes are   normal.  Visualized chest and abdominal organs are unremarkable.           Impression   No evidence of neoplasm in either breast.       RECOMMENDATION:       Annual bilateral mammogram and breast MRI are advised.       BIRADS:   1 - Negative

## 2022-02-10 ASSESSMENT — ENCOUNTER SYMPTOMS
NAUSEA: 0
CONSTIPATION: 0
SORE THROAT: 0
COUGH: 0
SHORTNESS OF BREATH: 0
VOICE CHANGE: 0
VOMITING: 0
BACK PAIN: 0
ABDOMINAL PAIN: 0
WHEEZING: 0
SINUS PRESSURE: 0
RHINORRHEA: 0
BLOOD IN STOOL: 0
EYE ITCHING: 0
ABDOMINAL DISTENTION: 0
CHEST TIGHTNESS: 0
CHOKING: 0
EYE DISCHARGE: 0
SINUS PAIN: 0
DIARRHEA: 0
TROUBLE SWALLOWING: 0

## 2022-02-10 NOTE — PROGRESS NOTES
Subjective:  Atypical lobular hyperplasia of the left breast on stereotactic breast biopsy in the 2:00 position on 18. Declined endocrine therapy. This note was copied forward from the last encounter. Essential components for this patient record were reviewed and verified on this visit including:  recent hospitalizations, recent imaging, PMH, PSH, FH, SOC HX, Allergies, and Medications were reviewed and updated as appropriate. In addition, the assessment and plan were copied from prior office note and updated accordingly. Patient ID: Simone Guadarrama is a 46 y.o. female. 2022  HPIHistory and Physical    Patient's Name/Date of Birth: Simone Guadarrama / 1970    Simone Guadarrama presents for clinical evaluation follow up. She has history of a left atypical lobular hyperplasia. PCP: Young No MD. Gynecologist:  Dr. Jaci Haddad. The mass was located in the upper inner quadrant position of the Left breast.  Patient denies a personal history of breast cancer. Breast cancer risk factors include family hx of Prostate CA and gender. Age of menarche was 13. Age of menopause is current. Patient denies hormonal therapy, has used OCP x 15y. Patient is . Age of first live birth was 27. Patient did breast feed. Recent menstrual period after one year, underwent endometrial biopsy: negative. Estimated body mass index is 24.03 kg/m² as calculated from the following:    Height as of 21: 5' 5\" (1.651 m). Weight as of 21: 144 lb 6.4 oz (65.5 kg). Bra Size: 34B  Caffeine use:  4-5 cups of coffee/day and a few glasses of ice tea  No h/o DVT/PE  No h/o XRT    Father had prostate cancer,     Patient drinks significant caffeinated beverages. She does not smoke cigarettes.       4/10/18, Screening mammogram, [address]:        18, Left spot magnifications, [address]:        Patient underwent a left stereotactic breast biopsy in the 2:00 position on 4/24/18. Pathology completed at [facility]) (Clip placed):        Past Surgical History:   Procedure Laterality Date    BREAST BIOPSY Left 05/2018    ADH    CARPAL TUNNEL RELEASE Bilateral        No current outpatient medications on file. No current facility-administered medications for this visit. No Known Allergies    Family History   Problem Relation Age of Onset    Cancer Father         skin and prostate    Cancer Brother         nonhodgkins lymphoma    Breast Cancer Maternal Aunt     Breast Cancer Maternal Aunt        Social History     Socioeconomic History    Marital status:      Spouse name: Not on file    Number of children: Not on file    Years of education: Not on file    Highest education level: Not on file   Occupational History    Not on file   Tobacco Use    Smoking status: Never Smoker    Smokeless tobacco: Never Used   Vaping Use    Vaping Use: Never used   Substance and Sexual Activity    Alcohol use: No     Comment: social    Drug use: No    Sexual activity: Not on file   Other Topics Concern    Not on file   Social History Narrative    Not on file     Social Determinants of Health     Financial Resource Strain:     Difficulty of Paying Living Expenses: Not on file   Food Insecurity:     Worried About [address] in the Last Year: Not on file    [address] in the Last Year: Not on file   Transportation Needs:     Lack of Transportation (Medical): Not on file    Lack of Transportation (Non-Medical):  Not on file   Physical Activity:     Days of Exercise per Week: Not on file    Minutes of Exercise per Session: Not on file   Stress:     Feeling of Stress : Not on file   Social Connections:     Frequency of Communication with Friends and Family: Not on file    Frequency of Social Gatherings with Friends and Family: Not on file    Attends Caodaism Services: Not on file    Active Member of Clubs or Organizations: Not on file   Mac Maddox history of DVT/PE. Objective:   Physical Exam  Vitals and nursing note reviewed. Constitutional:       General: She is not in acute distress. Appearance: She is well-developed and normal weight. She is not diaphoretic. Comments: ECOG is stable at 0; pleasant and conversant. HENT:      Head: Normocephalic and atraumatic. Mouth/Throat:      Pharynx: No oropharyngeal exudate. Eyes:      General: No scleral icterus. Right eye: No discharge. Left eye: No discharge. Conjunctiva/sclera: Conjunctivae normal.      Pupils: Pupils are equal, round, and reactive to light. Neck:      Thyroid: No thyromegaly. Vascular: No JVD. Trachea: No tracheal deviation. Cardiovascular:      Rate and Rhythm: Normal rate and regular rhythm. Heart sounds: No murmur heard. No friction rub. No gallop. Pulmonary:      Effort: Pulmonary effort is normal. No respiratory distress or retractions. Breath sounds: Normal breath sounds. No stridor. No wheezing or rales. Chest:      Chest wall: No mass, lacerations, deformity, swelling, tenderness or edema. Breasts: Breasts are symmetrical.      Right: No inverted nipple, mass, nipple discharge, skin change or tenderness. Left: No inverted nipple, mass, nipple discharge, skin change (biopsy site present) or tenderness. Comments: Breast tissue remains dense bilaterally, left greater than right. There are no associated skin changes. No nipple discharge. Her exam remains stable and without evidence of malignancy. Abdominal:      General: There is no distension. Palpations: Abdomen is soft. Tenderness: There is no abdominal tenderness. There is no guarding or rebound. Musculoskeletal:         General: No tenderness or deformity. Normal range of motion. Right shoulder: Normal. Normal range of motion. Left shoulder: Normal. Normal range of motion.       Cervical back: Normal range of motion and neck supple. Lymphadenopathy:      Cervical: No cervical adenopathy. Right cervical: No superficial, deep or posterior cervical adenopathy. Left cervical: No superficial, deep or posterior cervical adenopathy. Upper Body:      Right upper body: No pectoral adenopathy. Left upper body: No pectoral adenopathy. Skin:     General: Skin is warm and dry. Coloration: Skin is not pale. Findings: No erythema or rash. Neurological:      Mental Status: She is alert and oriented to person, place, and time. Coordination: Coordination normal.   Psychiatric:         Behavior: Behavior normal.         Thought Content: Thought content normal.         Judgment: Judgment normal.       Assessment:     Alexander Rogel is a pleasant 46 y.o. female who has a history of left atypical lobular hyperplasia and is on high-risk screening protocol.     -04/24/2018 underwent a left stereotactic breast biopsy in the 2:00 position. Pathology completed at [facility]) (Clip placed):            4/10/18, Screening mammogram, [address] [facility]:        4/19/18, Left spot magnifications, [address] [facility]:        -05/11/2018 MRI Bilateral breast:  Negative.  -10/29/2018 Left screening mammogram @ [facility]:  Negative.        -High risk screening:  Tyrer Cuzick score 26%. MRI bilateral breasts 11/26/2019:  BI-RADS 2, Benign,     -B/L breast US 12/12/2019:  Negative.    -05/28/2020: B/L screening mammogram; [facility]: Negative, BI-RADS 1  -12/03/2020 MRI B/L breast:  Post biopsy changes on left; Benign, BI-RADS 2.  -07/19/2021 B/L screening mammogram: Negative, BI_RADS 1.  -07/19/2021 Clinical follow up: is without evidence of malignancy. History reviewed on this visit; no change in risk for breast cancer as it relates to family history. We reviewed her hx ALH as it relates to breast cancer risk. She declines chemoprevention with Tamoxifen and prefers to continue high risk screening per protocol.   Imaging reviewed. -02/02/2022 MRI of the bilateral breast: Negative, BI-RADS 1.  -02/11/2022 clinical follow-up is without evidence of malignancy. Imaging reviewed, including her BI-RADS result. She is without breast related complaints on this visit. She continues to be active and works full-time at the [employer]. BSE reviewed. We reviewed her family history on this date; 2 maternal aunts with breast cancer diagnosed in their 76s. No new history of cancer to report on this visit. Plan to see in 6 months with bilateral screening mammogram same day. Plan:   1. Continue monthly breast self examination; detailed instructions reviewed today. Bring any changes to your physician's attention. 2. Continue healthy diet and exercise routinely as tolerated. 3. Avoid alcohol. 4. Limit caffeine intake. 5. Repeat MRI 02/2023 with OV. 6. Continue follow up with Primary Care. 7. RTC 6 months with bilateral screening mammogram same day. During today's visit, face-to-face time 25 minutes, greater than 50% in counseling education and coordination of care. All questions were answered to her apparent satisfaction, and she is agreeable to the plan as outlined above. Antonio Morris, RN, MSN, APRN-CNP, [address] [place]  Advanced Oncology Certified Nurse Practitioner  Department of Breast Surgery  [facility]/  [facility] in collaboration with Dr. Zeynep James.  [place]/Dr. Lyudmila Pillai APRN-CNP  2/11/2022

## 2022-02-11 ENCOUNTER — OFFICE VISIT (OUTPATIENT)
Dept: BREAST CENTER | Age: 52
End: 2022-02-11
Payer: COMMERCIAL

## 2022-02-11 VITALS
TEMPERATURE: 97.7 F | SYSTOLIC BLOOD PRESSURE: 104 MMHG | HEART RATE: 72 BPM | OXYGEN SATURATION: 98 % | WEIGHT: 143 LBS | BODY MASS INDEX: 23.82 KG/M2 | DIASTOLIC BLOOD PRESSURE: 57 MMHG | RESPIRATION RATE: 14 BRPM | HEIGHT: 65 IN

## 2022-02-11 DIAGNOSIS — N60.99 ATYPICAL LOBULAR HYPERPLASIA (ALH) OF BREAST: ICD-10-CM

## 2022-02-11 DIAGNOSIS — Z91.89 AT HIGH RISK FOR BREAST CANCER: ICD-10-CM

## 2022-02-11 PROCEDURE — 99213 OFFICE O/P EST LOW 20 MIN: CPT | Performed by: NURSE PRACTITIONER

## 2022-07-29 DIAGNOSIS — Z12.31 ENCOUNTER FOR SCREENING MAMMOGRAM FOR BREAST CANCER: Primary | ICD-10-CM

## 2022-08-01 NOTE — PROGRESS NOTES
Subjective:  Atypical lobular hyperplasia of the left breast on stereotactic breast biopsy in the 2:00 position on 4/24/18. Declined endocrine therapy with Tamoxifen. This note was copied forward from the last encounter. Essential components for this patient record were reviewed and verified on this visit including:  recent hospitalizations, recent imaging, PMH, PSH, FH, SOC HX, Allergies, and Medications were reviewed and updated as appropriate. In addition, the assessment and plan were copied from prior office note and updated accordingly. HPIHistory and Physical    Patient's Name/Date of Birth: Anusha Kiran / 1970    Anusha Kiran presents for follow up. She has history of a left atypical lobular hyperplasia; FH of breast cancer in two postmenopausal maternal aunts. PCP: Emmalene Denver, MD. Gynecologist:  Dr. Priscilla Mccormack. Apryl Dempsey is an extremely pleasant 46 y.o. female who presents for follow-up of left breast, atypical lobular hyperplasia located in the 2 o'clock position and detected on screening mammogram 04/10/2018 at [facility]. 04/10/2018 [facility]: Small cluster of punctate microcalcifications upper outer left breast.  Breast density grade 4.  04/19/2018 left spot magnification at [facility]: Suspicious microcalcifications, 2 o'clock position of the left breast.  04/24/2018 left breast stereotactic biopsy, 2 o'clock position, pathology review at [facility]:  Diagnosis:   Left breast, 2 o'clock position, stereotactic needle core biopsy:      * Focal atypical lobular hyperplasia. * Focal columnar cell change with associated focal microcalcifications. * Stromal fibrosis. Comment:  A few microcalcifications are also seen in association with benign breast lobules.   The findings are in essential agreement with the original reviewing pathologist    Seen by surgery team, Dr. Nimesh Honeycutt and Dr. Delvis Brian at Mouth/Throat:      Pharynx: No oropharyngeal exudate. Eyes:      Extraocular Movements: Extraocular movements intact. Conjunctiva/sclera: Conjunctivae normal.   Neck:      Vascular: No JVD. Cardiovascular:      Rate and Rhythm: Normal rate and regular rhythm. Heart sounds: Normal heart sounds. Pulmonary:      Effort: Pulmonary effort is normal. No respiratory distress or retractions. Breath sounds: Normal breath sounds. No wheezing or rales. Chest:      Chest wall: No mass, lacerations, deformity, swelling or edema. Breasts:     Breasts are symmetrical.      Comments: Breast tissue remains dense bilaterally, left greater than right. Her exam remains clinically and subjectively stable and without evidence of malignancy in either breast.     Abdominal:      Palpations: Abdomen is soft. Musculoskeletal:         General: No tenderness or deformity. Normal range of motion. Right shoulder: Normal.      Left shoulder: Normal.      Cervical back: Normal range of motion and neck supple. Lymphadenopathy:      Cervical: No cervical adenopathy. Right cervical: No superficial, deep or posterior cervical adenopathy. Left cervical: No superficial, deep or posterior cervical adenopathy. Upper Body:      Right upper body: No pectoral adenopathy. Left upper body: No pectoral adenopathy. Skin:     General: Skin is warm and dry. Coloration: Skin is not pale. Findings: No erythema or rash. Neurological:      General: No focal deficit present. Mental Status: She is alert and oriented to person, place, and time. Coordination: Coordination normal.   Psychiatric:         Mood and Affect: Mood normal.         Thought Content: Thought content normal.         Judgment: Judgment normal.   With   Assessment:     Apryl Dempsey presents for follow up of her left breast ALH. FH 2 maternal aunts with breast cancer in their 66's.  Father with prostate cancer age 62.    04/24/2018 left breast stereotactic biopsy, 2 o'clock position, pathology review at [facility]:  Diagnosis:   Left breast, 2 o'clock position, stereotactic needle core biopsy:      * Focal atypical lobular hyperplasia. * Focal columnar cell change with associated focal        microcalcifications. * Stromal fibrosis. Comment: A few microcalcifications are also seen in association with benign breast lobules. The findings are in essential agreement with   the original reviewing pathologist    Seen by surgery team, Dr. Beatrice Parikh and Dr. Delia Torres at different intervals, declined chemoprevention. Samson Lee risk score with the finding of ALH on biopsy:  26%    Imaging to date since [facility] on biopsy per high risk protocol:  11/09/2018 ultrasound left breast: Simple and complicated cysts. Benign, BI-RADS 2.  05/09/2019 bilateral screening mammogram @ [facility]: Postbiopsy changes benign, BI-RADS 2.  11/26/2019 MRI of the bilateral breast: Multiple bilateral masses compatible with cysts. Benign, BI-RADS 2.  12/12/2019 bilateral admitted breast ultrasound: Negative, BI-RADS 1.  05/28/2020 bilateral diagnostic mammogram: Negative, BI-RADS 1.  12/03/2020 MRI of the bilateral breasts: Benign findings. BI-RADS 2  07/19/2021 bilateral screening mammogram.  Negative, BI-RADS 1.  02/02/2022 MRI of the bilateral breast: Negative, BI-RADS 1.  08/08/2022 bilateral screening mammogram: final results pending. Imaging reviewed, awaiting final results. Clinical follow-up is without evidence of malignancy. Her breast tissue remains dense bilaterally, left greater than right. We discussed the importance of continued follow-up per high risk protocol until 5 years post [facility] finding. Once she is greater than 5 years from ending of [facility] on biopsy, can consider screening with mammogram followed 6 months later by bilateral whole breast ultrasounds.     Plan:     Continue monthly breast self examination; detailed instructions reviewed today. Bring any changes to your physician's attention. Continue healthy diet and exercise routinely as tolerated. Avoid alcohol. Limit caffeine intake. Repeat MRI 02/2023 with OV  Continue follow up with Primary Care. RTC 6 months with MRI of the bilateral breast prior. I spent a total of 27 minutes on the date of the service which included preparing to see the patient, face-to-face patient care, completing clinical documentation, obtaining and/or reviewing separately obtained history, performing a medically appropriate examination, counseling and educating the patient/family/caregiver, ordering medications, tests, or procedures, communicating with other HCPs (not separately reported), independently interpreting results (not separately reported), communicating results to the patient/family/caregiver and care coordination (not separately reported). This document is generated, in part, by voice recognition software and thus syntax and grammatical errors are possible. Milo Cruz RN, MSN, APRN-CNP, [address] [place]  Advanced Oncology Certified Nurse Practitioner  Department of Breast Surgery  [facility]/  [facility] in collaboration with Dr. Marifer Snow/Dr. Marlon Rouse/Dr. Stevo Yanez APRN-CNP

## 2022-08-05 ASSESSMENT — ENCOUNTER SYMPTOMS
RESPIRATORY NEGATIVE: 1
BACK PAIN: 0

## 2022-08-07 ENCOUNTER — HOSPITAL ENCOUNTER (EMERGENCY)
Age: 52
Discharge: HOME | End: 2022-08-07
Payer: COMMERCIAL

## 2022-08-07 VITALS
BODY MASS INDEX: 23.3 KG/M2 | TEMPERATURE: 97.5 F | RESPIRATION RATE: 16 BRPM | OXYGEN SATURATION: 98 % | WEIGHT: 140 LBS | SYSTOLIC BLOOD PRESSURE: 110 MMHG | HEART RATE: 74 BPM | DIASTOLIC BLOOD PRESSURE: 68 MMHG

## 2022-08-07 DIAGNOSIS — N30.01 ACUTE CYSTITIS WITH HEMATURIA: Primary | ICD-10-CM

## 2022-08-07 LAB
BACTERIA: ABNORMAL /HPF
BILIRUBIN URINE: NEGATIVE
BLOOD, URINE: ABNORMAL
CLARITY: CLEAR
COLOR: YELLOW
EPITHELIAL CELLS, UA: ABNORMAL /HPF
GLUCOSE URINE: NEGATIVE MG/DL
KETONES, URINE: NEGATIVE MG/DL
LEUKOCYTE ESTERASE, URINE: ABNORMAL
NITRITE, URINE: POSITIVE
PH UA: 6.5 (ref 5–9)
PROTEIN UA: ABNORMAL MG/DL
RBC UA: >20 /HPF (ref 0–2)
SPECIFIC GRAVITY UA: 1.02 (ref 1–1.03)
UROBILINOGEN, URINE: 0.2 E.U./DL
WBC UA: ABNORMAL /HPF (ref 0–5)

## 2022-08-07 PROCEDURE — 81001 URINALYSIS AUTO W/SCOPE: CPT

## 2022-08-07 PROCEDURE — 6370000000 HC RX 637 (ALT 250 FOR IP): Performed by: PHYSICIAN ASSISTANT

## 2022-08-07 PROCEDURE — 87088 URINE BACTERIA CULTURE: CPT

## 2022-08-07 PROCEDURE — 87186 SC STD MICRODIL/AGAR DIL: CPT

## 2022-08-07 PROCEDURE — 99283 EMERGENCY DEPT VISIT LOW MDM: CPT

## 2022-08-07 RX ORDER — PHENAZOPYRIDINE HYDROCHLORIDE 100 MG/1
200 TABLET, FILM COATED ORAL ONCE
Status: COMPLETED | OUTPATIENT
Start: 2022-08-07 | End: 2022-08-07

## 2022-08-07 RX ORDER — PHENAZOPYRIDINE HYDROCHLORIDE 100 MG/1
100 TABLET, FILM COATED ORAL 3 TIMES DAILY PRN
Qty: 9 TABLET | Refills: 0 | Status: SHIPPED | OUTPATIENT
Start: 2022-08-07 | End: 2022-08-10

## 2022-08-07 RX ORDER — CEPHALEXIN 500 MG/1
500 CAPSULE ORAL 4 TIMES DAILY
Qty: 40 CAPSULE | Refills: 0 | Status: SHIPPED | OUTPATIENT
Start: 2022-08-07 | End: 2022-08-17

## 2022-08-07 RX ORDER — CEPHALEXIN 500 MG/1
500 CAPSULE ORAL ONCE
Status: COMPLETED | OUTPATIENT
Start: 2022-08-07 | End: 2022-08-07

## 2022-08-07 RX ADMIN — PHENAZOPYRIDINE HYDROCHLORIDE 200 MG: 100 TABLET ORAL at 18:41

## 2022-08-07 RX ADMIN — CEPHALEXIN 500 MG: 500 CAPSULE ORAL at 18:42

## 2022-08-07 ASSESSMENT — PAIN DESCRIPTION - DESCRIPTORS
DESCRIPTORS: DISCOMFORT;BURNING
DESCRIPTORS: ACHING

## 2022-08-07 ASSESSMENT — PAIN - FUNCTIONAL ASSESSMENT: PAIN_FUNCTIONAL_ASSESSMENT: 0-10

## 2022-08-07 ASSESSMENT — PAIN SCALES - GENERAL
PAINLEVEL_OUTOF10: 9
PAINLEVEL_OUTOF10: 5

## 2022-08-07 ASSESSMENT — PAIN DESCRIPTION - LOCATION
LOCATION: ABDOMEN
LOCATION: OTHER (COMMENT)

## 2022-08-07 ASSESSMENT — PAIN DESCRIPTION - FREQUENCY: FREQUENCY: CONTINUOUS

## 2022-08-07 ASSESSMENT — PAIN DESCRIPTION - PAIN TYPE: TYPE: ACUTE PAIN

## 2022-08-07 ASSESSMENT — PAIN DESCRIPTION - ORIENTATION: ORIENTATION: LOWER

## 2022-08-07 NOTE — ED PROVIDER NOTES
[address]  Department of Emergency Medicine   ED  Encounter Note  Admit Date/RoomTime: 2022  6:01 PM  ED Room:   NAME: Raphael Perez  : 1970  MRN: [id number]     Chief Complaint:  Urinary Tract Infection (Frequent burning pain)    HISTORY OF PRESENT ILLNESS        Raphael Perez is a 46 y.o. female who presents to the ED with a concern for urinary tract infection. Patient states for couple days now she has had urinary frequency. Burning with urination. And bladder pressure. She denies any fevers or chills. Denies back pain or kidney pain. Denies any nausea or vomiting. All discomfort is to the bladder and she rates it a 9 out of 10. ROS   Pertinent positives and negatives are stated within HPI, all other systems reviewed and are negative. Past Medical History:  has a past medical history of Breast atypical lobular hyperplasia. Surgical History:  has a past surgical history that includes Carpal tunnel release (Bilateral) and Breast biopsy (Left, 2018). Social History:  reports that she has never smoked. She has never used smokeless tobacco. She reports current alcohol use. She reports that she does not use drugs. Family History: family history includes Breast Cancer in her maternal aunt and maternal aunt; Cancer in her brother and father. Allergies: Patient has no known allergies. PHYSICAL EXAM   Oxygen Saturation Interpretation: Normal on room air analysis. ED Triage Vitals   BP Temp Temp Source Heart Rate Resp SpO2 Height Weight   22 1748 22 1748 22 1748 22 1748 22 1748 22 1748 -- 22 1758   110/68 97.5 °F (36.4 °C) Tympanic 74 16 98 %  140 lb (63.5 kg)   General:  NAD. Alert and Oriented. Well-appearing. Skin:  Warm, dry. No rashes. Head:  Normocephalic. Atraumatic. Eyes:  EOMI. Conjunctiva normal.  ENT:  Oral mucosa moist.  Airway patent. Neck:  Supple.   Normal the urine culture results; this is to ensure you are on the most appropriate antibiotic therapy for your UTI. Plan of Care/Counseling:  Demond Keating reviewed today's visit with the patient in addition to providing specific details for the plan of care and counseling regarding the diagnosis and prognosis. Questions are answered at this time and are agreeable with the plan. ASSESSMENT     1. Acute cystitis with hematuria      PLAN   Discharged home. Patient condition is good    New Medications     New Prescriptions    CEPHALEXIN (KEFLEX) 500 MG CAPSULE    Take 1 capsule by mouth in the morning and 1 capsule at noon and 1 capsule in the evening and 1 capsule before bedtime. Do all this for 10 days. PHENAZOPYRIDINE (PYRIDIUM) 100 MG TABLET    Take 1 tablet by mouth 3 times daily as needed for Pain     Electronically signed by IVET Keating   DD: 8/7/22  **This report was transcribed using voice recognition software. Every effort was made to ensure accuracy; however, inadvertent computerized transcription errors may be present.   END OF ED PROVIDER NOTE       Demond Keating  08/07/22 [id number]

## 2022-08-08 ENCOUNTER — OFFICE VISIT (OUTPATIENT)
Dept: BREAST CENTER | Age: 52
End: 2022-08-08
Payer: COMMERCIAL

## 2022-08-08 VITALS
TEMPERATURE: 97.8 F | WEIGHT: 140 LBS | DIASTOLIC BLOOD PRESSURE: 70 MMHG | HEART RATE: 75 BPM | HEIGHT: 65 IN | SYSTOLIC BLOOD PRESSURE: 112 MMHG | RESPIRATION RATE: 20 BRPM | OXYGEN SATURATION: 98 % | BODY MASS INDEX: 23.32 KG/M2

## 2022-08-08 DIAGNOSIS — Z91.89 AT HIGH RISK FOR BREAST CANCER: ICD-10-CM

## 2022-08-08 DIAGNOSIS — N60.99 ATYPICAL LOBULAR HYPERPLASIA (ALH) OF BREAST: Primary | ICD-10-CM

## 2022-08-08 DIAGNOSIS — R92.2 DENSE BREAST TISSUE ON MAMMOGRAM: ICD-10-CM

## 2022-08-08 DIAGNOSIS — R92.2 DENSE BREAST: ICD-10-CM

## 2022-08-08 DIAGNOSIS — Z01.818 PRE-OP TESTING: ICD-10-CM

## 2022-08-08 DIAGNOSIS — Z80.3 FAMILY HISTORY OF BREAST CANCER: ICD-10-CM

## 2022-08-08 PROCEDURE — 99214 OFFICE O/P EST MOD 30 MIN: CPT | Performed by: NURSE PRACTITIONER

## 2022-08-08 ASSESSMENT — ENCOUNTER SYMPTOMS
SHORTNESS OF BREATH: 0
COUGH: 0

## 2022-08-10 LAB
ORGANISM: ABNORMAL
URINE CULTURE, ROUTINE: ABNORMAL

## 2023-01-26 ENCOUNTER — TELEPHONE (OUTPATIENT)
Dept: BREAST CENTER | Age: 53
End: 2023-01-26

## 2023-01-26 NOTE — TELEPHONE ENCOUNTER
----- Message from Martell Erickson RN sent at 8/8/2022 10:58 AM EDT -----  Breast MRI due in February 2023  Patient will need labs.   Patient is also a [place] candidate

## 2023-01-26 NOTE — TELEPHONE ENCOUNTER
RN attempt to contact patient to discuss moving forward with breast MRI. RN reached patient VM and left detailed message of why was calling and office number for patient to call office back. Please verify patient insurance, if she has cycles and also remind to get lab work drawn.          Electronically signed by Jennifer Ramos RN on 1/26/23 at 9:19 AM EST

## 2023-02-02 ENCOUNTER — TELEPHONE (OUTPATIENT)
Dept: BREAST CENTER | Age: 53
End: 2023-02-02

## 2023-02-02 NOTE — TELEPHONE ENCOUNTER
Left another message for patient with call back number. She is due for a breast MRI. She will likely need lab work prior.

## 2023-02-03 ENCOUNTER — TELEPHONE (OUTPATIENT)
Dept: BREAST CENTER | Age: 53
End: 2023-02-03

## 2023-02-03 NOTE — TELEPHONE ENCOUNTER
Patient returned our call. She states she has no cycles. She is aware to go and obtain labs at any [facility]. She states her insurance has not changed. Told her we will work on getting the MRI authorized and the schedulers will call her back to place on schedule.     Electronically signed by Elton Márquez RN on 2/3/23 at 12:50 PM EST

## 2023-02-07 DIAGNOSIS — Z01.818 PRE-OP TESTING: ICD-10-CM

## 2023-02-07 LAB
BUN BLDV-MCNC: 17 MG/DL (ref 6–20)
CREAT SERPL-MCNC: 1 MG/DL (ref 0.5–1)
GFR SERPL CREATININE-BSD FRML MDRD: >60 ML/MIN/1.73

## 2023-02-09 ENCOUNTER — TELEPHONE (OUTPATIENT)
Dept: BREAST CENTER | Age: 53
End: 2023-02-09

## 2023-02-09 NOTE — TELEPHONE ENCOUNTER
Authorization has been obtained for breast MRI [id number] - Approval # [id number] valid until 3/10/23 per [facility]

## 2023-03-21 ENCOUNTER — HOSPITAL ENCOUNTER (OUTPATIENT)
Dept: MRI IMAGING | Age: 53
Discharge: HOME | End: 2023-03-23
Payer: COMMERCIAL

## 2023-03-21 DIAGNOSIS — N60.99 ATYPICAL LOBULAR HYPERPLASIA (ALH) OF BREAST: ICD-10-CM

## 2023-03-21 DIAGNOSIS — Z91.89 AT HIGH RISK FOR BREAST CANCER: ICD-10-CM

## 2023-03-21 DIAGNOSIS — R92.2 DENSE BREAST: ICD-10-CM

## 2023-03-21 DIAGNOSIS — R92.2 DENSE BREAST TISSUE ON MAMMOGRAM: ICD-10-CM

## 2023-03-21 PROCEDURE — C8908 MRI W/O FOL W/CONT, BREAST,: HCPCS

## 2023-03-21 PROCEDURE — A9585 GADOBUTROL INJECTION: HCPCS | Performed by: RADIOLOGY

## 2023-03-21 PROCEDURE — 6360000004 HC RX CONTRAST MEDICATION: Performed by: RADIOLOGY

## 2023-03-21 RX ADMIN — GADOBUTROL 6 ML: 604.72 INJECTION INTRAVENOUS at 09:38

## 2023-03-30 ENCOUNTER — TELEPHONE (OUTPATIENT)
Dept: BREAST CENTER | Age: 53
End: 2023-03-30

## 2023-03-30 NOTE — TELEPHONE ENCOUNTER
Called and left detailed message with call back number to discuss recent breast MRI and schedule a clinical follow up in the [place] office. Electronically signed by Mary Ruiz RN on 3/30/23 at 11:22 AM EDT          FINDINGS:   Amount of fibroglandular tissue: Heterogeneous fibroglandular tissue       Background parenchymal enhancement: Minimal, symmetric       Right Breast: There is no evidence of abnormal enhancement. There are no   abnormal internal mammary or axillary lymph nodes identified. The nipple   areolar complex and skin are unremarkable. Left Breast:  There is no evidence of abnormal enhancement. There are no   abnormal internal mammary or axillary lymph nodes identified. The nipple   areolar complex and skin are unremarkable. There is susceptibility    artifact   from a metallic clip in the left lateral upper breast.       No significant extramammary findings are detected. Impression   No findings concerning for breast malignancy bilaterally       Recommendation: Annual mammogram will be due in August 2023       BIRADS:   BIRADS - CATEGORY 2       Benign Findings.        OVERALL ASSESSMENT - BENIGN

## 2023-04-06 ENCOUNTER — TELEPHONE (OUTPATIENT)
Dept: BREAST CENTER | Age: 53
End: 2023-04-06

## 2023-04-06 NOTE — TELEPHONE ENCOUNTER
Called and left detailed message with call back number to schedule a clinical follow up with Subha Hudson NP after her breast MRI.  Patient can be scheduled in [place]    Electronically signed by Diann Erickson RN on 4/6/23 at 9:47 AM EDT

## 2023-04-07 ASSESSMENT — ENCOUNTER SYMPTOMS
RESPIRATORY NEGATIVE: 1
SHORTNESS OF BREATH: 0
COUGH: 0
BACK PAIN: 0

## 2023-04-17 ENCOUNTER — OFFICE VISIT (OUTPATIENT)
Dept: BREAST CENTER | Age: 53
End: 2023-04-17
Payer: COMMERCIAL

## 2023-04-17 VITALS
HEART RATE: 78 BPM | BODY MASS INDEX: 23.66 KG/M2 | RESPIRATION RATE: 16 BRPM | DIASTOLIC BLOOD PRESSURE: 80 MMHG | SYSTOLIC BLOOD PRESSURE: 104 MMHG | TEMPERATURE: 98.1 F | OXYGEN SATURATION: 98 % | HEIGHT: 65 IN | WEIGHT: 142 LBS

## 2023-04-17 DIAGNOSIS — Z12.31 VISIT FOR SCREENING MAMMOGRAM: Primary | ICD-10-CM

## 2023-04-17 PROCEDURE — 99213 OFFICE O/P EST LOW 20 MIN: CPT | Performed by: NURSE PRACTITIONER

## 2023-09-21 ASSESSMENT — ENCOUNTER SYMPTOMS
BACK PAIN: 0
RESPIRATORY NEGATIVE: 1
SHORTNESS OF BREATH: 0
COUGH: 0

## 2023-09-21 NOTE — PROGRESS NOTES
agreement with   the original reviewing pathologist    Seen by surgery team, Dr. Cathleen Arora and Dr. Madison Layne at different intervals, declined chemoprevention. Rios Xiao risk score with the finding of ALH on biopsy:  26%    Imaging to date since [facility] on biopsy per high risk protocol:  Grade C breast density. 11/09/2018 ultrasound left breast: Simple and complicated cysts. Benign, BI-RADS 2.  05/09/2019 bilateral screening mammogram @ [facility]: Postbiopsy changes benign, BI-RADS 2.  11/26/2019 MRI of the bilateral breast: Multiple bilateral masses compatible with cysts. Benign, BI-RADS 2.  12/12/2019 bilateral limited breast ultrasound: Negative, BI-RADS 1.  05/28/2020 bilateral diagnostic mammogram: Negative, BI-RADS 1.  12/03/2020 MRI of the bilateral breasts: Benign findings. BI-RADS 2  07/19/2021 bilateral screening mammogram.  Negative, BI-RADS 1.  02/02/2022 MRI of the bilateral breast: Negative, BI-RADS 1.  08/08/2022 bilateral screening mammogram: 2 stable smooth bordered densities in bilateral upper quadrants. Benign, BI-RADS 2.  03/21/2023 MRI of the bilateral breast: No evidence of malignancy. BI-RADS 2.  09/25/2023 bilateral screening mammogram:  Pending. Key clinical points:  11/12/2018 Dr. Cathleen Arora:  Clinically, there is dense breast tissue bilaterally, left greater than right. No convincing evidence of malignancy. The biopsy site of the left breast, 2:00 position with residual post-biopsy mass. Check left breast US today. We again discussed chemoprevention with Tamoxifen. She was given written information regarding Tamoxifen and side effects. Reviewed her calculated risk score. She is not interested in chemoprevention at this time    05/28/2020 Dr. Madison Layne:  Clinically, extremely dense breast tissue bilaterally, L > R. No clinically suspicious findings on this exam. Family history reviewed.   Both paternal Aunts had breast cancer diagnosed in their 66's; their daughters are alive and well

## 2023-09-25 ENCOUNTER — OFFICE VISIT (OUTPATIENT)
Dept: BREAST CENTER | Age: 53
End: 2023-09-25
Payer: COMMERCIAL

## 2023-09-25 VITALS
HEIGHT: 65 IN | RESPIRATION RATE: 20 BRPM | HEART RATE: 80 BPM | SYSTOLIC BLOOD PRESSURE: 110 MMHG | DIASTOLIC BLOOD PRESSURE: 60 MMHG | WEIGHT: 140 LBS | OXYGEN SATURATION: 98 % | TEMPERATURE: 97 F | BODY MASS INDEX: 23.32 KG/M2

## 2023-09-25 DIAGNOSIS — N60.99 ATYPICAL LOBULAR HYPERPLASIA (ALH) OF BREAST: Primary | ICD-10-CM

## 2023-09-25 PROCEDURE — 99213 OFFICE O/P EST LOW 20 MIN: CPT | Performed by: NURSE PRACTITIONER

## 2023-09-25 ASSESSMENT — ENCOUNTER SYMPTOMS
VOMITING: 0
ABDOMINAL PAIN: 0
CHOKING: 0
ABDOMINAL DISTENTION: 0
WHEEZING: 0
ANAL BLEEDING: 0
CHEST TIGHTNESS: 0
NAUSEA: 0
DIARRHEA: 0
CONSTIPATION: 0

## 2024-03-15 NOTE — PROGRESS NOTES
No clinically suspicious findings on this exam. Family history reviewed.  Both paternal Aunts had breast cancer diagnosed in their 70's; their daughters are alive and well with no evidence of cancer.  No further family history of cancer.  Aury has two older sisters who have no breast related concerns at this time.  Differed chemoprevention in the past.     08/08/2022 Clinical follow-up is without evidence of malignancy.  Her breast tissue remains dense bilaterally, left greater than right.  We discussed the importance of continued follow-up per high risk protocol until 5 years post ALH finding.  Once she is greater than 5 years post finding of ALH on biopsy, can consider screening with mammogram followed 6 months later by bilateral whole breast ultrasounds.    04/17/2023 clinical follow-up is without evidence of malignancy.  She continues to have dense breast tissue bilaterally with multiple, mobile, benign feeling densities. We discussed plans moving forward for continued screening.  She has concerns regarding annual gadolinium injections for breast MRI.  We discussed NCCN guidelines for breast imaging for those who are deemed to have a predisposition to breast cancer.  We discussed that her Tyrer Cuzick risk score is high secondary to her history of atypical lobular hyperplasia and not family history.  Some studies suggest that the Tyrer Cuzick risk score overestimates risks in the setting of hyperplasia and atypia.  We discussed that bilateral complete breast ultrasounds can be considered although they are not nearly as sensitive as a breast MRI for detecting breast cancer and, they are not within the currently recommended guidelines for high risk screening.  We briefly discussed that there are conflicting reports on the value of ultrasound screening for women with breast dense tissue.   At this time, we will plan to see in 6 months with bilateral screening mammogram same day.  She was encouraged to continue

## 2024-03-25 DIAGNOSIS — R92.2 DENSE BREAST: ICD-10-CM

## 2024-03-25 DIAGNOSIS — R92.30 DENSE BREAST: ICD-10-CM

## 2024-03-25 DIAGNOSIS — N60.99 ATYPICAL LOBULAR HYPERPLASIA (ALH) OF BREAST: Primary | ICD-10-CM

## 2024-03-26 ENCOUNTER — OFFICE VISIT (OUTPATIENT)
Dept: BREAST CENTER | Age: 54
End: 2024-03-26
Payer: COMMERCIAL

## 2024-03-26 ENCOUNTER — HOSPITAL ENCOUNTER (OUTPATIENT)
Dept: GENERAL RADIOLOGY | Age: 54
Discharge: HOME | End: 2024-03-28
Payer: COMMERCIAL

## 2024-03-26 VITALS
TEMPERATURE: 97.8 F | OXYGEN SATURATION: 96 % | RESPIRATION RATE: 12 BRPM | DIASTOLIC BLOOD PRESSURE: 66 MMHG | SYSTOLIC BLOOD PRESSURE: 112 MMHG | HEIGHT: 65 IN | BODY MASS INDEX: 23.99 KG/M2 | HEART RATE: 60 BPM | WEIGHT: 144 LBS

## 2024-03-26 VITALS — WEIGHT: 140 LBS | BODY MASS INDEX: 23.32 KG/M2 | HEIGHT: 65 IN

## 2024-03-26 DIAGNOSIS — N60.99 ATYPICAL LOBULAR HYPERPLASIA (ALH) OF BREAST: ICD-10-CM

## 2024-03-26 DIAGNOSIS — R92.30 DENSE BREAST: ICD-10-CM

## 2024-03-26 DIAGNOSIS — Z12.31 SCREENING MAMMOGRAM, ENCOUNTER FOR: Primary | ICD-10-CM

## 2024-03-26 DIAGNOSIS — R92.2 DENSE BREAST: ICD-10-CM

## 2024-03-26 PROCEDURE — 99213 OFFICE O/P EST LOW 20 MIN: CPT | Performed by: NURSE PRACTITIONER

## 2024-03-26 PROCEDURE — 76641 ULTRASOUND BREAST COMPLETE: CPT

## 2024-09-20 NOTE — PROGRESS NOTES
She is not interested in chemoprevention at this time    05/28/2020 Dr. Snow:  Clinically, extremely dense breast tissue bilaterally, L > R.  No clinically suspicious findings on this exam. Family history reviewed.  Both paternal Aunts had breast cancer diagnosed in their 70's; their daughters are alive and well with no evidence of cancer.  No further family history of cancer.  Aury has two older sisters who have no breast related concerns at this time.  Differed chemoprevention in the past.     08/08/2022 Clinical follow-up is without evidence of malignancy.  Her breast tissue remains dense bilaterally, left greater than right.  We discussed the importance of continued follow-up per high risk protocol until 5 years post ALH finding.  Once she is greater than 5 years post finding of ALH on biopsy, can consider screening with mammogram followed 6 months later by bilateral whole breast ultrasounds.    04/17/2023 clinical follow-up is without evidence of malignancy.  She continues to have dense breast tissue bilaterally with multiple, mobile, benign feeling densities. We discussed plans moving forward for continued screening.  She has concerns regarding annual gadolinium injections for breast MRI.  We discussed NCCN guidelines for breast imaging for those who are deemed to have a predisposition to breast cancer.  We discussed that her Tyrer Cuzick risk score is high secondary to her history of atypical lobular hyperplasia and not family history.  Some studies suggest that the Tyrer Cuzick risk score overestimates risks in the setting of hyperplasia and atypia.  We discussed that bilateral complete breast ultrasounds can be considered although they are not nearly as sensitive as a breast MRI for detecting breast cancer and, they are not within the currently recommended guidelines for high risk screening.  We briefly discussed that there are conflicting reports on the value of ultrasound screening for women with

## 2024-09-30 ENCOUNTER — OFFICE VISIT (OUTPATIENT)
Dept: BREAST CENTER | Age: 54
End: 2024-09-30
Payer: COMMERCIAL

## 2024-09-30 VITALS
HEIGHT: 65 IN | SYSTOLIC BLOOD PRESSURE: 110 MMHG | BODY MASS INDEX: 23.82 KG/M2 | DIASTOLIC BLOOD PRESSURE: 68 MMHG | WEIGHT: 143 LBS | TEMPERATURE: 97.8 F | RESPIRATION RATE: 14 BRPM | OXYGEN SATURATION: 99 % | HEART RATE: 66 BPM

## 2024-09-30 DIAGNOSIS — R92.30 DENSE BREAST: ICD-10-CM

## 2024-09-30 DIAGNOSIS — N60.99 ATYPICAL LOBULAR HYPERPLASIA (ALH) OF BREAST: Primary | ICD-10-CM

## 2024-09-30 DIAGNOSIS — Z01.818 PREOP TESTING: ICD-10-CM

## 2024-09-30 PROCEDURE — 99213 OFFICE O/P EST LOW 20 MIN: CPT | Performed by: NURSE PRACTITIONER

## 2024-09-30 ASSESSMENT — ENCOUNTER SYMPTOMS: BLOOD IN STOOL: 0

## 2024-09-30 NOTE — PATIENT INSTRUCTIONS
Breast MRI due March 2025. We will place a reminder to call as time gets closer. You are also welcome to call our office at [phone].

## 2025-03-04 ENCOUNTER — TELEPHONE (OUTPATIENT)
Dept: BREAST CENTER | Age: 55
End: 2025-03-04

## 2025-03-04 NOTE — TELEPHONE ENCOUNTER
----- Message from KWAME HUA RN sent at 9/30/2024  9:18 AM EDT -----  Breast MRI due March 22, 2025 or later. Check for insurance changes. Needs labs. No cycles. Followed by an office visit ([place]) Hx of atypical lobular hyperplasia

## 2025-03-04 NOTE — TELEPHONE ENCOUNTER
RN attempted to contact patient to discuss moving forward with breast MRI. RN reached patient voicemail and left detailed message of why was calling and office number for patient to call office back.      Electronically signed by Nguyen Zaman RN on 3/4/25 at 2:00 PM EST

## 2025-03-19 ENCOUNTER — TELEPHONE (OUTPATIENT)
Dept: BREAST CENTER | Age: 55
End: 2025-03-19

## 2025-03-19 NOTE — TELEPHONE ENCOUNTER
Called and spoke with patient regarding scheduling breast MRI. Will take order to schedulers to call patient and schedule. Patient knows to get labs.    Electronically signed by Aleta John RN on 3/19/25 at 12:09 PM EDT

## 2025-04-23 DIAGNOSIS — Z01.818 PREOP TESTING: ICD-10-CM

## 2025-04-23 LAB
BUN BLDV-MCNC: 18 MG/DL (ref 6–20)
CREAT SERPL-MCNC: 0.9 MG/DL (ref 0.5–1)
GFR, ESTIMATED: 75 ML/MIN/1.73M2

## 2025-05-06 ENCOUNTER — HOSPITAL ENCOUNTER (OUTPATIENT)
Dept: MRI IMAGING | Age: 55
Discharge: HOME | End: 2025-05-08
Payer: COMMERCIAL

## 2025-05-06 DIAGNOSIS — R92.30 DENSE BREAST: ICD-10-CM

## 2025-05-06 DIAGNOSIS — N60.99 ATYPICAL LOBULAR HYPERPLASIA (ALH) OF BREAST: ICD-10-CM

## 2025-05-06 PROCEDURE — A9585 GADOBUTROL INJECTION: HCPCS | Performed by: RADIOLOGY

## 2025-05-06 PROCEDURE — 6360000004 HC RX CONTRAST MEDICATION: Performed by: RADIOLOGY

## 2025-05-06 PROCEDURE — C8908 MRI W/O FOL W/CONT, BREAST,: HCPCS

## 2025-05-06 RX ORDER — GADOBUTROL 604.72 MG/ML
7 INJECTION INTRAVENOUS
Status: COMPLETED | OUTPATIENT
Start: 2025-05-06 | End: 2025-05-06

## 2025-05-06 RX ADMIN — GADOBUTROL 7 ML: 604.72 INJECTION INTRAVENOUS at 10:06

## 2025-05-07 ENCOUNTER — TELEPHONE (OUTPATIENT)
Dept: BREAST CENTER | Age: 55
End: 2025-05-07

## 2025-05-07 NOTE — TELEPHONE ENCOUNTER
RN attempted to contact patient to schedule follow up after breast MRI. RN reached patient voicemail and left detailed message of why was calling and office number for patient to call office back and set up appt.      Patient needs scheduled for ESTABLISHED: 6 month follow up - atypical lobular hyperplasia --Benign breast MRI 5/6/25     Patient can be seen in [place]      Electronically signed by Nguyen Zaman RN on 5/7/25 at 11:39 AM EDT

## 2025-05-07 NOTE — TELEPHONE ENCOUNTER
RN received return call from patient to schedule follow up. Patient was scheduled 6/9/25 @ 8am in [place] office with NP.        Electronically signed by Nguyen Zaman RN on 5/7/25 at 12:31 PM EDT

## 2025-05-21 NOTE — PROGRESS NOTES
Normal rate and regular rhythm.      Heart sounds: Normal heart sounds.   Pulmonary:      Effort: Pulmonary effort is normal. No respiratory distress or retractions.      Breath sounds: Normal breath sounds. No wheezing, rhonchi or rales.   Chest:      Chest wall: No mass, lacerations, deformity, swelling or edema.   Breasts:     Breasts are symmetrical.      Comments: Breast tissue remains dense bilaterally, left greater than right.  Overall, her bilateral breast exam remains clinically stable and without secondary evidence of malignancy or disease.  No axillary lymphadenopathy.  No nipple discharge.  Good ROM of the bilateral UE.  Abdominal:      Palpations: Abdomen is soft.   Musculoskeletal:         General: No tenderness or deformity. Normal range of motion.      Right shoulder: Normal.      Left shoulder: Normal.      Cervical back: Normal range of motion and neck supple.   Lymphadenopathy:      Cervical: No cervical adenopathy.      Right cervical: No superficial, deep or posterior cervical adenopathy.     Left cervical: No superficial, deep or posterior cervical adenopathy.      Upper Body:      Right upper body: No pectoral adenopathy.      Left upper body: No pectoral adenopathy.   Skin:     General: Skin is warm and dry.      Coloration: Skin is not pale.      Findings: No erythema or rash.   Neurological:      General: No focal deficit present.      Mental Status: She is alert and oriented to person, place, and time.      Coordination: Coordination normal.   Psychiatric:         Mood and Affect: Mood normal.         Thought Content: Thought content normal.         Judgment: Judgment normal.       Assessment:     Aury is  a luz 54 y.o. female who presents for follow up of her left breast ALH.  Her family history includes 2 maternal aunts with breast cancer in their 70's. Father with prostate cancer age 62. Family history reviewed with no pertinent updates to report.    04/24/2018 left breast

## 2025-06-09 ENCOUNTER — OFFICE VISIT (OUTPATIENT)
Dept: BREAST CENTER | Age: 55
End: 2025-06-09
Payer: COMMERCIAL

## 2025-06-09 VITALS
HEIGHT: 65 IN | DIASTOLIC BLOOD PRESSURE: 60 MMHG | SYSTOLIC BLOOD PRESSURE: 106 MMHG | RESPIRATION RATE: 20 BRPM | TEMPERATURE: 97.4 F | WEIGHT: 140 LBS | OXYGEN SATURATION: 99 % | HEART RATE: 68 BPM | BODY MASS INDEX: 23.32 KG/M2

## 2025-06-09 DIAGNOSIS — Z80.0 FAMILY HISTORY OF PANCREATIC CANCER: ICD-10-CM

## 2025-06-09 DIAGNOSIS — Z12.11 SCREEN FOR COLON CANCER: ICD-10-CM

## 2025-06-09 DIAGNOSIS — Z12.31 ENCOUNTER FOR SCREENING MAMMOGRAM FOR HIGH-RISK PATIENT: Primary | ICD-10-CM

## 2025-06-09 PROCEDURE — 99213 OFFICE O/P EST LOW 20 MIN: CPT | Performed by: NURSE PRACTITIONER

## 2025-06-09 ASSESSMENT — ENCOUNTER SYMPTOMS
RECTAL PAIN: 0
STRIDOR: 0

## 2025-06-21 LAB — NONINV COLON CA DNA+OCC BLD SCRN STL QL: NEGATIVE

## 2025-06-23 ENCOUNTER — RESULTS FOLLOW-UP (OUTPATIENT)
Age: 55
End: 2025-06-23

## 2025-06-23 NOTE — TELEPHONE ENCOUNTER
Left a VM for Aury that Cologuard results were negative.  In the absence of symptoms, plan to repeat in 3 years; copy to primary care.  Left a phone number for return call in the event she has any questions.    Stephanie Larkin RN (Terrie), MSN, APRN-CNP, AOCNP  Advanced Oncology Certified Nurse Practitioner  [facility]/Department of Breast Surgery   Office Phone [phone]; Fax [phone]  Located within the [facility]